# Patient Record
Sex: MALE | Race: WHITE | URBAN - METROPOLITAN AREA
[De-identification: names, ages, dates, MRNs, and addresses within clinical notes are randomized per-mention and may not be internally consistent; named-entity substitution may affect disease eponyms.]

---

## 2021-04-02 ENCOUNTER — OFFICE VISIT (OUTPATIENT)
Dept: URBAN - METROPOLITAN AREA CLINIC 24 | Facility: CLINIC | Age: 76
End: 2021-04-02
Payer: MEDICARE

## 2021-04-02 DIAGNOSIS — H18.593 OTHER HEREDITARY CORNEAL DYSTROPHIES, BILATERAL: ICD-10-CM

## 2021-04-02 DIAGNOSIS — H33.8 OTHER RETINAL DETACHMENTS: ICD-10-CM

## 2021-04-02 PROCEDURE — 92025 CPTRIZED CORNEAL TOPOGRAPHY: CPT | Performed by: OPHTHALMOLOGY

## 2021-04-02 PROCEDURE — 99204 OFFICE O/P NEW MOD 45 MIN: CPT | Performed by: OPHTHALMOLOGY

## 2021-04-02 ASSESSMENT — VISUAL ACUITY
OD: 20/40
OS: 20/20

## 2021-04-02 ASSESSMENT — INTRAOCULAR PRESSURE
OS: 15
OD: 13

## 2021-04-02 NOTE — IMPRESSION/PLAN
Impression: Other secondary cataract, right eye: H26.491. Plan: Opacified capsule with option for YAG laser capsulotomy. Discussed procedure, risks, benefits and side effects. Patient agrees to YAG laser capsulotomy.  Schedule YAG laser OD with Dr. Yuridia Moreno

## 2021-04-02 NOTE — IMPRESSION/PLAN
Impression: Other hereditary corneal dystrophies, bilateral: H18.593. Plan: Visually significant. Discussed (anterior keratectomy) AK  to help smooth surface. Surface irregularity contributing to decreased vision through causing additional astigmatism. Recommend smoothening procedure with to remove irregularity. This is a treatment for the symptom, not a cure for the cause of the problem and recurrence can happen. Dry eye care stressed to patient. Surface can take 1-4 weeks to heal and irregularities can take 1-3 months to stabilize. Pt understands that vision can initially be worse secondary to treatment. Patient understands that treatment isn't urgent and is their choice. Risks include infection and recurrence. A contact lens (hard or soft) may be needed for best vision. The change in refraction is difficult to predict. RBA reviewed including infection, irregular astigmatism and delayed healing. Main complaint is OD. Will perform yag capsulotomy OD first to see if this improves his vision. If not will proceed with AK OD. Have pt seen by optom after yag for refraction check to see  if vision improved and pt is happy, if not send to medical counsellors to schedule AK OD with Dr. Tracy Lee.

## 2021-05-03 ENCOUNTER — ADULT PHYSICAL (OUTPATIENT)
Dept: URBAN - METROPOLITAN AREA CLINIC 24 | Facility: CLINIC | Age: 76
End: 2021-05-03
Payer: MEDICARE

## 2021-05-03 DIAGNOSIS — H26.491 OTHER SECONDARY CATARACT, RIGHT EYE: ICD-10-CM

## 2021-05-03 DIAGNOSIS — Z01.818 ENCOUNTER FOR OTHER PREPROCEDURAL EXAMINATION: Primary | ICD-10-CM

## 2021-05-03 PROCEDURE — 99203 OFFICE O/P NEW LOW 30 MIN: CPT | Performed by: PHYSICIAN ASSISTANT

## 2021-05-03 RX ORDER — POTASSIUM CITRATE 10 MEQ/1
TABLET ORAL
Qty: 0 | Refills: 0 | Status: INACTIVE
Start: 2021-05-03 | End: 2021-06-28

## 2021-05-03 RX ORDER — PANTOPRAZOLE SODIUM 40 MG/1
40 MG TABLET, DELAYED RELEASE ORAL
Qty: 0 | Refills: 0 | Status: ACTIVE
Start: 2021-05-03

## 2021-05-06 ENCOUNTER — SURGERY (OUTPATIENT)
Dept: URBAN - METROPOLITAN AREA SURGERY 12 | Facility: SURGERY | Age: 76
End: 2021-05-06
Payer: MEDICARE

## 2021-05-06 PROCEDURE — 66821 AFTER CATARACT LASER SURGERY: CPT | Performed by: OPHTHALMOLOGY

## 2021-06-08 ENCOUNTER — POST-OPERATIVE VISIT (OUTPATIENT)
Dept: URBAN - METROPOLITAN AREA CLINIC 26 | Facility: CLINIC | Age: 76
End: 2021-06-08

## 2021-06-08 DIAGNOSIS — Z48.810 ENCOUNTER FOR SURGICAL AFTERCARE FOLLOWING SURGERY ON A SENSE ORGAN: Primary | ICD-10-CM

## 2021-06-08 PROCEDURE — 99024 POSTOP FOLLOW-UP VISIT: CPT | Performed by: STUDENT IN AN ORGANIZED HEALTH CARE EDUCATION/TRAINING PROGRAM

## 2021-06-08 ASSESSMENT — INTRAOCULAR PRESSURE
OS: 15
OD: 10

## 2021-06-08 ASSESSMENT — VISUAL ACUITY
OD: 20/20
OS: 20/20

## 2021-06-08 NOTE — IMPRESSION/PLAN
Impression: S/P YAG Capsulotomy (Yttrium Aluminum [place]) OD - 33 Days. Encounter for surgical aftercare following surgery on a sense organ  Z48.810. Post operative instructions reviewed - Plan: PVD without retinal pathology. No family history, prior peripheral retinal disease, or other risk factors evident. Warning signs of retinal tear and detachment discussed with patient. RTC ASAP if any s/s consistent with RT or RD.

## 2021-06-25 ENCOUNTER — POST-OPERATIVE VISIT (OUTPATIENT)
Dept: URBAN - METROPOLITAN AREA CLINIC 24 | Facility: CLINIC | Age: 76
End: 2021-06-25
Payer: MEDICARE

## 2021-06-25 PROCEDURE — 99214 OFFICE O/P EST MOD 30 MIN: CPT | Performed by: OPTOMETRIST

## 2021-06-25 PROCEDURE — 92250 FUNDUS PHOTOGRAPHY W/I&R: CPT | Performed by: OPTOMETRIST

## 2021-06-25 ASSESSMENT — INTRAOCULAR PRESSURE
OS: 13
OD: 11

## 2021-06-25 NOTE — IMPRESSION/PLAN
Impression: Retinal detachment of right eye: H33.21.
-- mac off x ~ 1 week per pt description (was on vacation)
-- h/o rd os, pt is s/p yag od 5/6/21 Plan: PT SEEN AS ER VISIT IN POST-OP PERIOD FOLLOWING YAG FOR UNRELATED ISSUE Pt edu. Surgical repair indicated. 
Called retina on-call; pt will be scheduled for repair Dr. Lala Dillon 6/30 in [facility].

## 2021-06-29 NOTE — IMPRESSION/PLAN
Impression: Other retinal detachments, OS Plan: old rd - previous retina provider is Dr. Katherine Blackburn.

## 2021-06-30 ENCOUNTER — SURGERY (OUTPATIENT)
Dept: URBAN - METROPOLITAN AREA SURGERY 5 | Facility: SURGERY | Age: 76
End: 2021-06-30
Payer: MEDICARE

## 2021-06-30 PROCEDURE — 67108 REPAIR DETACHED RETINA: CPT | Performed by: OPHTHALMOLOGY

## 2021-07-01 ENCOUNTER — POST-OPERATIVE VISIT (OUTPATIENT)
Dept: URBAN - METROPOLITAN AREA CLINIC 24 | Facility: CLINIC | Age: 76
End: 2021-07-01
Payer: MEDICARE

## 2021-07-01 PROCEDURE — 99024 POSTOP FOLLOW-UP VISIT: CPT | Performed by: OPTOMETRIST

## 2021-07-01 ASSESSMENT — INTRAOCULAR PRESSURE: OD: 17

## 2021-07-01 NOTE — IMPRESSION/PLAN
Impression: S/P PPVIT OD - 1 Day. Encounter for surgical aftercare following surgery on a sense organ  Z48.810. Excellent post op course Plan: Continue pred / ofloxacin tid as directed. Maintain positioning as directed.

## 2021-07-13 ENCOUNTER — OFFICE VISIT (OUTPATIENT)
Dept: URBAN - METROPOLITAN AREA CLINIC 24 | Facility: CLINIC | Age: 76
End: 2021-07-13
Payer: MEDICARE

## 2021-07-13 PROCEDURE — 99024 POSTOP FOLLOW-UP VISIT: CPT | Performed by: OPHTHALMOLOGY

## 2021-07-13 ASSESSMENT — INTRAOCULAR PRESSURE
OD: 24
OS: 24

## 2021-07-13 NOTE — IMPRESSION/PLAN
Impression: Retinal detachment of right eye: H33.21. Plan: s/p PPV Laser Gas, mac attached, healing well. cont drops as directed. monitor RTC 3-6 weeks

## 2021-07-27 ENCOUNTER — OFFICE VISIT (OUTPATIENT)
Dept: URBAN - METROPOLITAN AREA CLINIC 24 | Facility: CLINIC | Age: 76
End: 2021-07-27
Payer: MEDICARE

## 2021-07-27 PROCEDURE — 99024 POSTOP FOLLOW-UP VISIT: CPT | Performed by: OPHTHALMOLOGY

## 2021-07-27 ASSESSMENT — INTRAOCULAR PRESSURE
OD: 12
OS: 17

## 2021-07-27 NOTE — IMPRESSION/PLAN
Impression: Retinal detachment of right eye: H33.21. Plan: s/p PPV Laser Gas, mac attached. gas resolved. stable. monitor RTC 3-4 months

## 2021-11-02 ENCOUNTER — OFFICE VISIT (OUTPATIENT)
Dept: URBAN - METROPOLITAN AREA CLINIC 24 | Facility: CLINIC | Age: 76
End: 2021-11-02
Payer: MEDICARE

## 2021-11-02 DIAGNOSIS — H33.21 RETINAL DETACHMENT OF RIGHT EYE: Primary | ICD-10-CM

## 2021-11-02 PROCEDURE — 99213 OFFICE O/P EST LOW 20 MIN: CPT | Performed by: OPHTHALMOLOGY

## 2021-11-02 ASSESSMENT — INTRAOCULAR PRESSURE
OS: 18
OD: 14

## 2021-11-02 NOTE — IMPRESSION/PLAN
Impression: Retinal detachment of right eye: H33.21. Plan: s/p PPV Laser Gas, mac attached. stable. monitor RTC PRN, 6-12 months Jimmy Connelly

## 2022-06-09 ENCOUNTER — OFFICE VISIT (OUTPATIENT)
Dept: URBAN - METROPOLITAN AREA CLINIC 26 | Facility: CLINIC | Age: 77
End: 2022-06-09
Payer: MEDICARE

## 2022-06-09 DIAGNOSIS — H33.8 OTHER RETINAL DETACHMENTS: ICD-10-CM

## 2022-06-09 DIAGNOSIS — H52.4 PRESBYOPIA: ICD-10-CM

## 2022-06-09 DIAGNOSIS — H18.593 OTHER HEREDITARY CORNEAL DYSTROPHIES: Primary | ICD-10-CM

## 2022-06-09 DIAGNOSIS — Z96.1 PRESENCE OF INTRAOCULAR LENS: ICD-10-CM

## 2022-06-09 DIAGNOSIS — H33.21 RETINAL DETACHMENT OF RIGHT EYE: ICD-10-CM

## 2022-06-09 DIAGNOSIS — H53.2 DIPLOPIA: ICD-10-CM

## 2022-06-09 PROCEDURE — 92134 CPTRZ OPH DX IMG PST SGM RTA: CPT | Performed by: OPTOMETRIST

## 2022-06-09 PROCEDURE — 92014 COMPRE OPH EXAM EST PT 1/>: CPT | Performed by: OPTOMETRIST

## 2022-06-09 ASSESSMENT — VISUAL ACUITY
OD: 20/30
OS: 20/25

## 2022-06-09 ASSESSMENT — INTRAOCULAR PRESSURE
OD: 15
OS: 15

## 2022-06-09 ASSESSMENT — KERATOMETRY
OS: 44.75
OD: 43.00

## 2022-06-09 NOTE — IMPRESSION/PLAN
Impression: Other retinal detachments, OS Plan: old rd - previous retina provider is Dr. Yuly Medeiros.

## 2022-06-09 NOTE — IMPRESSION/PLAN
Impression: Retinal detachment of right eye: H33.21. Plan: s/p PPV OD. retina flat/attached. rd precautions reviewed.

## 2022-06-09 NOTE — IMPRESSION/PLAN
Continue current medications for now   Please send me a blood sugar log for review in 1-2 weeks    please discussed cholesterol, triglyceride levels with Cardiology when you follow-up with them  Triglyceride levels continue to be high     Please had lab work done as ordered at 8:00 a m   In the morning    Follow-up in 3 months Impression: Diplopia: H53.2. Plan: binocular horizontal diplopia since RD repair OD. increased decomepnsating phoria. srx with prism recommend. observe.

## 2022-06-09 NOTE — IMPRESSION/PLAN
Impression: Other hereditary corneal dystrophies: H18.593. Plan: stable. bcva od: 20/30 os: 20/25. retina likely limits vision. at's prn OU. observe.

## 2022-06-09 NOTE — IMPRESSION/PLAN
Impression: Presbyopia: H52.4. Plan: srx with prism recommended. rtc prn if NI or worsens, otherwise monitor yearly.